# Patient Record
Sex: FEMALE | URBAN - METROPOLITAN AREA
[De-identification: names, ages, dates, MRNs, and addresses within clinical notes are randomized per-mention and may not be internally consistent; named-entity substitution may affect disease eponyms.]

---

## 2017-04-25 ENCOUNTER — EMERGENCY (EMERGENCY)
Facility: HOSPITAL | Age: 47
LOS: 0 days | Discharge: HOME | End: 2017-04-25
Admitting: INTERNAL MEDICINE

## 2017-06-28 DIAGNOSIS — M25.532 PAIN IN LEFT WRIST: ICD-10-CM

## 2021-02-20 ENCOUNTER — TRANSCRIPTION ENCOUNTER (OUTPATIENT)
Age: 51
End: 2021-02-20

## 2022-09-12 PROBLEM — Z00.00 ENCOUNTER FOR PREVENTIVE HEALTH EXAMINATION: Status: ACTIVE | Noted: 2022-09-12

## 2023-01-18 ENCOUNTER — APPOINTMENT (OUTPATIENT)
Dept: OBGYN | Facility: CLINIC | Age: 53
End: 2023-01-18

## 2024-08-17 ENCOUNTER — NON-APPOINTMENT (OUTPATIENT)
Age: 54
End: 2024-08-17

## 2024-08-19 ENCOUNTER — APPOINTMENT (OUTPATIENT)
Dept: ORTHOPEDIC SURGERY | Facility: CLINIC | Age: 54
End: 2024-08-19
Payer: MEDICAID

## 2024-08-19 ENCOUNTER — TRANSCRIPTION ENCOUNTER (OUTPATIENT)
Age: 54
End: 2024-08-19

## 2024-08-19 DIAGNOSIS — M62.838 OTHER MUSCLE SPASM: ICD-10-CM

## 2024-08-19 DIAGNOSIS — S46.812A STRAIN OF OTHER MUSCLES, FASCIA AND TENDONS AT SHOULDER AND UPPER ARM LEVEL, LEFT ARM, INITIAL ENCOUNTER: ICD-10-CM

## 2024-08-19 PROCEDURE — 72050 X-RAY EXAM NECK SPINE 4/5VWS: CPT

## 2024-08-19 PROCEDURE — 73030 X-RAY EXAM OF SHOULDER: CPT | Mod: LT

## 2024-08-19 PROCEDURE — 99203 OFFICE O/P NEW LOW 30 MIN: CPT

## 2024-08-19 RX ORDER — CYCLOBENZAPRINE HYDROCHLORIDE 10 MG/1
10 TABLET, FILM COATED ORAL
Qty: 14 | Refills: 0 | Status: ACTIVE | COMMUNITY
Start: 2024-08-19 | End: 1900-01-01

## 2024-08-19 RX ORDER — MELOXICAM 15 MG/1
15 TABLET ORAL
Qty: 30 | Refills: 0 | Status: ACTIVE | COMMUNITY
Start: 2024-08-19 | End: 1900-01-01

## 2024-08-19 NOTE — HISTORY OF PRESENT ILLNESS
[de-identified] : Patient is a 54-year-old female here for evaluation of left shoulder/neck.  Patient states on 8/17/2024 she fell on her left side.  Patient was immediate pain.  Patient went to [facility] urgent care, x-rays were taken of left shoulder and patient was told that she did not have a fracture.  Patient was allowed follow-up with orthopedics.  Denies numbness/tingling, denies instability.  Pain worsens with activities such as range of motion of neck, range of motion of shoulder  Left shoulder exam: No effusion no ecchymosis, no erythema tenderness palpation to trapezius, mild tenderness palpation to anterior glenohumeral joint.  Good range of motion, good strength, negative Raymond, negative Jobes, neurovascular intact  Cervical spine exam: No effusion no ecchymosis no erythema mild tenderness place patient to left trapezius muscle, no midline tenderness, good range of motion, good strength, no gross instability, neurovascular intact  X-ray left shoulder in office today: No acute fractures, subluxations, dislocations  X-ray cervical spine: No acute fractures, subluxation, dislocations  Discussed with patient detail that she has strain of left trapezius, muscle spasm.  Discussed treatment options detail occluding rest, ice that she can range boxes, physical therapy.  Meloxicam sent to patient's pharmacy, discussed side effects in detail.  Cyclobenzaprine sent to patient's pharmacy, discussed effects in detail.  Patient will follow-up in 6 weeks for reevaluation.  Call if any questions or concerns.  Patient understands agrees with plan

## 2024-09-09 ENCOUNTER — APPOINTMENT (OUTPATIENT)
Dept: ORTHOPEDIC SURGERY | Facility: CLINIC | Age: 54
End: 2024-09-09
Payer: MEDICAID

## 2024-09-09 DIAGNOSIS — S43.432D SUPERIOR GLENOID LABRUM LESION OF LEFT SHOULDER, SUBSEQUENT ENCOUNTER: ICD-10-CM

## 2024-09-09 DIAGNOSIS — M54.12 RADICULOPATHY, CERVICAL REGION: ICD-10-CM

## 2024-09-09 DIAGNOSIS — S49.91XD UNSPECIFIED INJURY OF RIGHT SHOULDER AND UPPER ARM, SUBSEQUENT ENCOUNTER: ICD-10-CM

## 2024-09-09 DIAGNOSIS — S49.92XD UNSPECIFIED INJURY OF LEFT SHOULDER AND UPPER ARM, SUBSEQUENT ENCOUNTER: ICD-10-CM

## 2024-09-09 PROCEDURE — 99204 OFFICE O/P NEW MOD 45 MIN: CPT

## 2024-09-09 PROCEDURE — 99214 OFFICE O/P EST MOD 30 MIN: CPT

## 2024-09-09 RX ORDER — MELOXICAM 15 MG/1
15 TABLET ORAL DAILY
Qty: 30 | Refills: 1 | Status: ACTIVE | COMMUNITY
Start: 2024-09-09 | End: 1900-01-01

## 2024-09-09 NOTE — HISTORY OF PRESENT ILLNESS
[de-identified] : Patient is here for evaluation 3 weeks ago she did slip on some water at [facility] and then fell and injuring mostly on the left neck and left shoulder area pain right shoulder and rib area, she also has some numbness and tingling, on the left rib area going to the chest wall  Physical exam she has decreased range of motion of the neck with pain mostly in the left trapezial area shoulder has positive Mercer's but good range of motion bilaterally, she has AC joint pain with palpation  Diagnosis is left-sided cervical radiculopathy, left shoulder SLAP tear, left and right shoulder AC joint injury and pain  Recommend anti-inflammatories side effects discussed.  Prescription sent to pharmacy, physical therapy, prescription given, MRI of the left shoulder, MRI of the C-spine, will let the spine doctors take care of that I will take care of both left and right shoulder we will see her back in 2 months

## 2024-09-16 ENCOUNTER — APPOINTMENT (OUTPATIENT)
Dept: ORTHOPEDIC SURGERY | Facility: CLINIC | Age: 54
End: 2024-09-16
Payer: MEDICAID

## 2024-09-16 DIAGNOSIS — S16.1XXA STRAIN OF MUSCLE, FASCIA AND TENDON AT NECK LEVEL, INITIAL ENCOUNTER: ICD-10-CM

## 2024-09-16 PROCEDURE — 99213 OFFICE O/P EST LOW 20 MIN: CPT

## 2024-09-16 NOTE — HISTORY OF PRESENT ILLNESS
[de-identified] : ORIGINAL PRESENTATION: Ms. Phillips is a 54-year-old female here for evaluation of left shoulder/neck. Patient states on 8/17/2024 she fell on her left side. Patient was immediate pain. Patient went to [facility] urgent care, x-rays were taken of left shoulder and patient was told that she did not have a fracture. Patient was allowed follow-up with orthopedics. Denies numbness/tingling, denies instability. Pain worsens with activities such as range of motion of neck, range of motion of shoulder.  TODAY:  I had the pleasure of seeing Ms. Phillips today in follow up. Her previous history and physical findings have been reviewed.  She was initially seen in the walk in for evaluation of L shoulder/neck pain that began after an injury that occurred on August 17, 2024.  She states she has not yet started PT but did receive authorization for MRIs of both her L shoulder and cervical spine which she is looking to complete in the next week or 2.  She is also interested in starting PT and we will discuss this further.  She denies any radicular component and states the pain is localized to the Trapezius muscles and exacerbated with ROM.  She has not yet taken the Diclofenac she was prescribed for pain but states is considering it for days the pain increases.

## 2024-09-16 NOTE — IMAGING
[de-identified] : cervical spine:  + tenderness over trapezius muscle L>R, decreased ROM with extension and lateral rotation due to pain and stiffness, 5/5 strength upper extremities, 2+ reflexes, NV intact.

## 2024-09-16 NOTE — ASSESSMENT
[FreeTextEntry1] : 54 year old female with cervical pain s/p injury.  She will undergo MRI cervical spine as mentioned, attend PT and use diclofenac prn.  She will f/u after MRI is completed to see Dr. Zhao for further assessment. She is aware if there is any issue she can contact the office and verbalizes her understanding and agreement.

## 2024-09-30 ENCOUNTER — APPOINTMENT (OUTPATIENT)
Dept: ORTHOPEDIC SURGERY | Facility: CLINIC | Age: 54
End: 2024-09-30
Payer: MEDICAID

## 2024-09-30 DIAGNOSIS — S16.1XXA STRAIN OF MUSCLE, FASCIA AND TENDON AT NECK LEVEL, INITIAL ENCOUNTER: ICD-10-CM

## 2024-09-30 PROCEDURE — 99203 OFFICE O/P NEW LOW 30 MIN: CPT

## 2024-09-30 PROCEDURE — 99213 OFFICE O/P EST LOW 20 MIN: CPT

## 2024-09-30 NOTE — DISCUSSION/SUMMARY
[de-identified] : 54-year-old female with some cervical pain.  I reviewed her MRI of her cervical spine with her I do not recommend any surgical intervention.  Discussed pain management.  With regards to her breast pain and vertigo I do not think the cervical spine accounts for that and have advised her to follow-up with the either neurology or her family doctor to discuss.

## 2024-09-30 NOTE — DATA REVIEWED
[FreeTextEntry1] : I reviewed the patient's MRI of her cervical spine done [place] [facility]  She has some mild foraminal stenosis at C5-6 worse on the right and a C6-7 worse on the left.  There is no spinal cord compression.

## 2024-09-30 NOTE — HISTORY OF PRESENT ILLNESS
[de-identified] : If 54-year-old female presents to me with neck pain.  The patient had injury in August.  She fell.  Since then she has been having neck pain.  She is been having breast pain.  She has been having left chest and shoulder pain.  She has numbness and tingling in both of her hands.  No loss of bladder or bowel.  She has not yet began physical therapy.  She also has vertigo.  All of these began after the injury.

## 2024-09-30 NOTE — IMAGING
[de-identified] : TTP midline cervical spine and paraspinal musculature   Strength                                                                     Deltoid   Right: 5/5; Left: 5/5                      Biceps   Right: 5/5; Left: 5/5                   Triceps        Right: 5/5; Left: 5/5                                 Wrist Extensors     Right: 5/5; Left: 5/5 Finger Flexors     Right: 5/5; Left: 5/5 IO    Right: 5/5; Left: 5/5  Sensation C5   Right: 2/2; Left: 2/2 C6   Right: 2/2; Left: 2/2 C7   Right: 2/2; Left: 2/2 C8   Right: 2/2; Left: 2/2 T1   Right: 2/2; Left: 2/2  Reflexes Biceps   Right: 2+; Left 2+ Triceps   Right: 2+; Left 2+ Edmonds's  Right: Negative; L: Negative

## 2024-10-07 ENCOUNTER — APPOINTMENT (OUTPATIENT)
Dept: ORTHOPEDIC SURGERY | Facility: CLINIC | Age: 54
End: 2024-10-07
Payer: MEDICAID

## 2024-10-07 DIAGNOSIS — S43.432D SUPERIOR GLENOID LABRUM LESION OF LEFT SHOULDER, SUBSEQUENT ENCOUNTER: ICD-10-CM

## 2024-10-07 DIAGNOSIS — M54.12 RADICULOPATHY, CERVICAL REGION: ICD-10-CM

## 2024-10-07 PROCEDURE — 99214 OFFICE O/P EST MOD 30 MIN: CPT

## 2024-11-01 ENCOUNTER — APPOINTMENT (OUTPATIENT)
Dept: NEUROLOGY | Facility: CLINIC | Age: 54
End: 2024-11-01

## 2024-11-11 ENCOUNTER — APPOINTMENT (OUTPATIENT)
Dept: ORTHOPEDIC SURGERY | Facility: CLINIC | Age: 54
End: 2024-11-11